# Patient Record
(demographics unavailable — no encounter records)

---

## 2024-11-26 NOTE — REASON FOR VISIT
[Initial Consultation] : an initial consultation for [Other: ______] : provided by [facility] [Time Spent: ____ minutes] : Total time spent using  services: [unfilled] minutes. The patient's primary language is not English thus required  services. [FreeTextEntry2] : throat pain [Interpreters_IDNumber] : [id number] [Interpreters_FullName] : Chanel [TWNoteComboBox1] : [place]

## 2024-11-26 NOTE — ASSESSMENT
[FreeTextEntry1] : Patient with c/o throat discomfort for several mos.  No evidence of sinusitis but has findings of LPR - recommended omeprazole before breakfast and reflux diet .  Followup 2mos.

## 2024-11-26 NOTE — HISTORY OF PRESENT ILLNESS
[de-identified] : c/o throat pain - started about 2 mos ago.  Feels discomfort - no sharp pain.  No problems eating or drinking - occ hurts to swallow.  No hemoptysis.  No SOB.   No fever  No raspy voice  Has mod amt of coffee and occ  spicy foods.  Occ PND

## 2024-11-26 NOTE — REVIEW OF SYSTEMS
[Hoarseness] : hoarseness [Throat Pain] : throat pain [Negative] : Heme/Lymph [de-identified] : hard to swallow, symptoms ongoing for 2 months, pt states he had cold before sore throat